# Patient Record
Sex: FEMALE | Race: WHITE | HISPANIC OR LATINO | Employment: UNEMPLOYED | URBAN - METROPOLITAN AREA
[De-identification: names, ages, dates, MRNs, and addresses within clinical notes are randomized per-mention and may not be internally consistent; named-entity substitution may affect disease eponyms.]

---

## 2019-03-19 ENCOUNTER — HOSPITAL ENCOUNTER (EMERGENCY)
Facility: HOSPITAL | Age: 24
Discharge: HOME | End: 2019-03-19
Attending: EMERGENCY MEDICINE
Payer: MEDICAID

## 2019-03-19 VITALS
WEIGHT: 280 LBS | SYSTOLIC BLOOD PRESSURE: 156 MMHG | RESPIRATION RATE: 18 BRPM | HEIGHT: 63 IN | HEART RATE: 80 BPM | OXYGEN SATURATION: 98 % | BODY MASS INDEX: 49.61 KG/M2 | TEMPERATURE: 98 F | DIASTOLIC BLOOD PRESSURE: 74 MMHG

## 2019-03-19 DIAGNOSIS — H60.501 ACUTE OTITIS EXTERNA OF RIGHT EAR, UNSPECIFIED TYPE: Primary | ICD-10-CM

## 2019-03-19 PROCEDURE — 25000003 PHARM REV CODE 250: Performed by: NURSE PRACTITIONER

## 2019-03-19 PROCEDURE — 99283 EMERGENCY DEPT VISIT LOW MDM: CPT

## 2019-03-19 RX ORDER — CIPROFLOXACIN AND DEXAMETHASONE 3; 1 MG/ML; MG/ML
4 SUSPENSION/ DROPS AURICULAR (OTIC) EVERY 12 HOURS
Qty: 7.5 ML | Refills: 0 | Status: SHIPPED | OUTPATIENT
Start: 2019-03-19 | End: 2019-03-26

## 2019-03-19 RX ORDER — CIPROFLOXACIN AND DEXAMETHASONE 3; 1 MG/ML; MG/ML
4 SUSPENSION/ DROPS AURICULAR (OTIC)
Status: COMPLETED | OUTPATIENT
Start: 2019-03-19 | End: 2019-03-19

## 2019-03-19 RX ADMIN — CIPROFLOXACIN AND DEXAMETHASONE 4 DROP: 3; 1 SUSPENSION/ DROPS AURICULAR (OTIC) at 05:03

## 2019-03-19 NOTE — ED TRIAGE NOTES
Patient arrived to ED with c/o right ear pain, fever, decreased hearing in right ear, and pain to mastoid area right ear x 3 days.  Denies sore throat, congestion, nasal drainage, headache, dizziness, or blurred vision.  No acute distress noted.

## 2019-03-19 NOTE — ED PROVIDER NOTES
Encounter Date: 3/19/2019       History     Chief Complaint   Patient presents with    Otalgia     Reports right ear pain and swelling to her rt ear that started approx 3 days ago but worsened this morning. Denies any ear drainage. Pt reports irrigating her ear with peroxide on yesterday without relief. Reports decreased hearing (sounds like water is in her ear). Denies fever/chills.      23-year-old female with no pertinent past medical history presenting for evaluation of right ear pain that began 3 days ago.  Denies drainage or bleeding from the ear.  Denies fever, sinus congestion, rhinorrhea, cough, or any additional symptoms. States that hearing in the affected ear is slightly decreased.  No medications attempted prior to arrival.          Review of patient's allergies indicates:  No Known Allergies  History reviewed. No pertinent past medical history.  History reviewed. No pertinent surgical history.  No family history on file.  Social History     Tobacco Use    Smoking status: Never Smoker   Substance Use Topics    Alcohol use: No     Frequency: Never    Drug use: No     Review of Systems   Constitutional: Negative for chills, fatigue and fever.   HENT: Positive for ear pain. Negative for congestion, ear discharge, facial swelling, hearing loss, nosebleeds, postnasal drip, rhinorrhea, sinus pressure, sinus pain, sneezing, sore throat, tinnitus, trouble swallowing and voice change.    Eyes: Negative for photophobia and pain.   Respiratory: Negative for apnea, cough, choking, chest tightness, shortness of breath, wheezing and stridor.    Cardiovascular: Negative for chest pain, palpitations and leg swelling.   Gastrointestinal: Negative for abdominal pain, constipation, diarrhea, nausea and vomiting.   Genitourinary: Negative for dysuria, flank pain, hematuria, pelvic pain and vaginal discharge.   Musculoskeletal: Negative for arthralgias, back pain, gait problem and neck pain.   Skin: Negative for color  change, pallor, rash and wound.   Neurological: Negative for dizziness, seizures, syncope, facial asymmetry, light-headedness and headaches.   Hematological: Negative for adenopathy.   Psychiatric/Behavioral: Negative for confusion. The patient is not nervous/anxious.        Physical Exam     Initial Vitals [03/19/19 0447]   BP Pulse Resp Temp SpO2   (!) 156/74 80 18 97.8 °F (36.6 °C) 98 %      MAP       --         Physical Exam    Nursing note and vitals reviewed.  Constitutional: She appears well-developed and well-nourished. She is not diaphoretic. She is active and cooperative.  Non-toxic appearance. She does not have a sickly appearance. She does not appear ill. No distress.   Pleasant, afebrile, well appearing, in no distress   HENT:   Head: Normocephalic and atraumatic.   Right Ear: Tympanic membrane and external ear normal. No lacerations. There is tenderness. No foreign bodies. No mastoid tenderness. Tympanic membrane is not perforated. No middle ear effusion. Decreased hearing is noted.   Left Ear: Hearing, tympanic membrane, external ear and ear canal normal. No lacerations. No tenderness. No foreign bodies. No mastoid tenderness. Tympanic membrane is not perforated.  No middle ear effusion. No decreased hearing is noted.   Nose: Nose normal.   TMs are normal and intact bilaterally. No evidence of perforation.  There is swelling and exudate present in the right ear canal.  Pain with manipulation of the external right ear.  The left ear canal is normal. No mastoid erythema, tenderness, or swelling bilaterally. No oropharyngeal abnormalities.  No frontal or maxillary sinus tenderness.   Eyes: Conjunctivae and EOM are normal. Right eye exhibits no discharge. Left eye exhibits no discharge.   Neck: Normal range of motion. Neck supple. No tracheal deviation present.   Cardiovascular: Normal rate.   Pulmonary/Chest: No stridor. No respiratory distress.   Abdominal: Soft. She exhibits no distension. There is no  tenderness.   Musculoskeletal: Normal range of motion. She exhibits no tenderness.   Neurological: She is alert and oriented to person, place, and time. She has normal strength. No cranial nerve deficit or sensory deficit.   Skin: Skin is warm and dry.   Psychiatric: She has a normal mood and affect. Her behavior is normal. Judgment and thought content normal.         ED Course   Procedures  Labs Reviewed - No data to display       Imaging Results    None          Medical Decision Making:   History:   Old Medical Records: I decided to obtain old medical records.  Differential Diagnosis:   Otitis media, otitis externa, TM perforation, mastoiditis, TMJ syndrome, dental infection, others  ED Management:  (S) 23 y.o. female complains of pain in right ear for 3 days. No fever or URI symptoms. Has not been swimming.  States that she believes water may have become trapped in her ear after a shower several days ago.  HPI and physical exam as above.    (O) She appears well, afebrile. Right ear reveals tenderness of the tragus; debris and inflammation in external canal. TM is not well seen due to debris, but visualized aspects appear normal.    (A) Otitis Externa    (P) Instructed to keep ear dry until better; eardrops per orders (Ciprodex).  First dose given in the ED. Advised patient to follow up with her PCP for re-evaluation and further management.  ED return precautions given. All questions regarding diagnosis and plan were answered to the patient's fullest possible satisfaction. Patient expressed understanding of diagnosis, discharge instructions, and return precautions.    My attending physician was available for consultation during this case                      Clinical Impression:       ICD-10-CM ICD-9-CM   1. Acute otitis externa of right ear, unspecified type H60.501 380.10         Disposition:   Disposition: Discharged  Condition: Stable                        Corwin Hollins NP  03/19/19 0536

## 2019-03-19 NOTE — DISCHARGE INSTRUCTIONS
Use antibiotic drops twice daily (every 12 hours) for 7 days.  It may be helpful to have someone administer the drops for you.  Lie down with your right ear in the air before drops are placed in your ear.  Stay in that position for 5 minutes after drops have been placed.  Do not sticking any objects into your ear or use a cotton ball to block your ear.      Schedule an appointment to follow-up with your regular doctor or the one listed on your discharge paperwork for further treatment and testing.  Return to the emergency department for any new or worsening symptoms or as needed for any additional concerns.    Thank you for coming to our Emergency Department today. It is important to remember that some problems are difficult to diagnose and may not be found during your first visit. Be sure to follow up with your primary care doctor.  If you do not have one, you may contact the one listed on your discharge paperwork or you may also call the [facility] Appointment Desk at [phone] to schedule an appointment with one.     Return to the ER with any questions/concerns, new/concerning symptoms, worsening or failure to improve. Do not drive or make any important decisions for 24 hours if you have received any pain medications, sedatives or mood altering drugs during your ER visit.

## 2019-05-29 ENCOUNTER — HOSPITAL ENCOUNTER (EMERGENCY)
Facility: HOSPITAL | Age: 24
Discharge: HOME | End: 2019-05-29
Attending: EMERGENCY MEDICINE
Payer: MEDICAID

## 2019-05-29 VITALS
RESPIRATION RATE: 18 BRPM | HEIGHT: 64 IN | OXYGEN SATURATION: 100 % | HEART RATE: 79 BPM | BODY MASS INDEX: 49.51 KG/M2 | TEMPERATURE: 98 F | SYSTOLIC BLOOD PRESSURE: 134 MMHG | WEIGHT: 290 LBS | DIASTOLIC BLOOD PRESSURE: 62 MMHG

## 2019-05-29 DIAGNOSIS — M79.606 LEG PAIN: ICD-10-CM

## 2019-05-29 DIAGNOSIS — M25.569 KNEE PAIN: Primary | ICD-10-CM

## 2019-05-29 LAB
B-HCG UR QL: NEGATIVE
CTP QC/QA: YES

## 2019-05-29 PROCEDURE — 81025 URINE PREGNANCY TEST: CPT | Performed by: PHYSICIAN ASSISTANT

## 2019-05-29 PROCEDURE — 99284 EMERGENCY DEPT VISIT MOD MDM: CPT | Mod: 25

## 2019-05-29 RX ORDER — IBUPROFEN 600 MG/1
600 TABLET ORAL EVERY 6 HOURS PRN
Qty: 20 TABLET | Refills: 0 | Status: SHIPPED | OUTPATIENT
Start: 2019-05-29 | End: 2019-06-03

## 2019-05-29 NOTE — ED PROVIDER NOTES
Encounter Date: 5/29/2019    SCRIBE #1 NOTE: IClaude, am scribing for, and in the presence of,  Darrel Venegas PA-C. I have scribed the following portions of the note - Other sections scribed: HPI, ROS.       History     Chief Complaint   Patient presents with    Knee Pain     Pt reports on going hx of R knee pain off and on. Pt states this pain started Friday. Pain just below patella and at back of knee.      CC: Knee Pain    HPI: This is a 23 y.o. F who has no PMHx who presents to the ED for emergent evaluation of recurrent atraumatic right knee pain x's 5 days. Pt reports a prior injury to the right knee while exercising in the gym 1.5 years ago. Her last episode of knee pain since the knee injury was 2 months ago. No OTC treatment attempted for current pain since onset. Pt notes contraceptive use. Pt denies right knee swelling, tobacco use, alcohol use, or recreational drug use.     The history is provided by the patient. No  was used.     Review of patient's allergies indicates:  No Known Allergies  History reviewed. No pertinent past medical history.  History reviewed. No pertinent surgical history.  Family History   Problem Relation Age of Onset    Breast cancer Neg Hx     Colon cancer Neg Hx     Ovarian cancer Neg Hx      Social History     Tobacco Use    Smoking status: Never Smoker   Substance Use Topics    Alcohol use: No     Frequency: Never    Drug use: No     Review of Systems   Constitutional: Negative for chills and fever.   HENT: Negative for ear pain and sore throat.    Eyes: Negative for pain.   Respiratory: Negative for cough and shortness of breath.    Cardiovascular: Negative for chest pain.   Gastrointestinal: Negative for abdominal pain, diarrhea, nausea and vomiting.   Genitourinary: Negative for dysuria.   Musculoskeletal: Positive for arthralgias (right knee). Negative for back pain and joint swelling.   Skin: Negative for rash.   Neurological: Negative  for headaches.       Physical Exam     Initial Vitals [05/29/19 1303]   BP Pulse Resp Temp SpO2   136/67 88 18 98.7 °F (37.1 °C) 99 %      MAP       --         Physical Exam    Nursing note and vitals reviewed.  Constitutional: She appears well-developed and well-nourished. No distress.   HENT:   Head: Normocephalic and atraumatic.   Nose: Nose normal.   Mouth/Throat: Oropharynx is clear and moist.   Eyes: EOM are normal. Pupils are equal, round, and reactive to light.   Neck: Normal range of motion. Neck supple.   Cardiovascular: Normal rate, regular rhythm and normal heart sounds. Exam reveals no gallop and no friction rub.    No murmur heard.  Pulmonary/Chest: Breath sounds normal. No respiratory distress. She has no wheezes. She has no rhonchi. She has no rales.   Abdominal: Soft. Bowel sounds are normal. There is no tenderness. There is no rebound and no guarding.   Musculoskeletal: Normal range of motion.        Right knee: She exhibits normal range of motion, no swelling, no effusion, no ecchymosis, no deformity, no erythema, normal alignment, no LCL laxity, normal patellar mobility, no bony tenderness and no MCL laxity. Tenderness found.   There is mild tenderness to the anterior aspect of the knee and the popliteal fossa.   Neurological: She is alert and oriented to person, place, and time. She has normal strength. No cranial nerve deficit or sensory deficit.   Skin: Skin is warm and dry. Capillary refill takes less than 2 seconds.   Psychiatric: She has a normal mood and affect.         ED Course   Procedures  Labs Reviewed   POCT URINE PREGNANCY          Imaging Results          US Lower Extremity Veins Right (Final result)  Result time 05/29/19 15:25:38    Final result by Altaf Ge MD (05/29/19 15:25:38)                 Impression:      1. No sonographic evidence of RLE DVT.      Electronically signed by: Altaf Ge  Date:    05/29/2019  Time:    15:25             Narrative:    EXAMINATION:  US  LOWER EXTREMITY VEINS RIGHT    CLINICAL HISTORY:  Acute onset of right calf pain, swelling and discomfort.    TECHNIQUE:  Grayscale, duplex color doppler, spectral interrogation and graded compression of the right lower extremity veins    COMPARISON:  None    FINDINGS:  Right thigh veins: The common femoral, femoral, popliteal, upper greater saphenous, and deep femoral veins are patent and free of thrombus. The veins are normally compressible and have normal phasic flow and augmentation response.    Right calf veins: The visualized calf veins are patent.    Contralateral CFV: The contralateral (left) common femoral vein is patent and free of thrombus.    Miscellaneous: None                               X-Ray Knee 3 View Right (Final result)  Result time 05/29/19 14:12:29    Final result by Altaf Ge MD (05/29/19 14:12:29)                 Impression:      1. No displaced acute fracture, dislocation, retained radiopaque foreign body or suspicious osseous lesion.      Electronically signed by: Altaf Ge  Date:    05/29/2019  Time:    14:12             Narrative:    EXAMINATION:  XR KNEE 3 VIEW RIGHT    CLINICAL HISTORY:  Right knee pain    TECHNIQUE:  3 views of the right knee    COMPARISON:  None    FINDINGS:  No displaced acute fracture, dislocation, retained radiopaque foreign body or suspicious osseous lesion.  Anatomic alignment is maintained.    Regional soft tissues are grossly unremarkable.                                 Medical Decision Making:   Clinical Tests:   Lab Tests: Ordered and Reviewed  Radiological Study: Ordered and Reviewed  ED Management:  This is an evaluation of a 23 y.o. female who presents to the ED for knee pain.  Vital signs are stable.   Afebrile.  Patient is nontoxic appearing and in no acute distress. There is mild tenderness to palpation to the anterior aspect knee as well as in the popliteal fossa.  No ligamentous laxity.  Patient has equal palpable pulses in the bilateral  lower extremities. No edema, deformity or ecchymoses.  No crepitus.  Patient is ambulatory.  Patient has full range of motion of the right knee.  X-ray of the right knee shows no acute fracture or dislocation.  Ultrasound of the right lower extremity shows no evidence of DVT.  Etiology patient's pain likely secondary to knee strain.  I considered but doubt septic joint, DVT, fracture, dislocation, baker cyst, bursitis.  Patient encouraged to follow up with Orthopedics.    Patient given return precautions and instructed to return to the emergency department for any new or worsening symptoms. Patient verbalized understanding and agreed with plan.               Scribe Attestation:   Scribe #1: I performed the above scribed service and the documentation accurately describes the services I performed. I attest to the accuracy of the note.    Attending Attestation:           Physician Attestation for Scribe:  Physician Attestation Statement for Scribe #1: I, Darrel Venegas PA-C, reviewed documentation, as scribed by Claude Sterling in my presence, and it is both accurate and complete.                    Clinical Impression:       ICD-10-CM ICD-9-CM   1. Knee pain M25.569 719.46   2. Leg pain M79.606 729.5                                Darrel Venegas PA-C  05/29/19 1742

## 2019-10-08 ENCOUNTER — HOSPITAL ENCOUNTER (EMERGENCY)
Facility: HOSPITAL | Age: 24
Discharge: HOME | End: 2019-10-08
Attending: EMERGENCY MEDICINE
Payer: MEDICAID

## 2019-10-08 VITALS
TEMPERATURE: 98 F | SYSTOLIC BLOOD PRESSURE: 125 MMHG | HEART RATE: 73 BPM | WEIGHT: 293 LBS | OXYGEN SATURATION: 99 % | BODY MASS INDEX: 50.02 KG/M2 | HEIGHT: 64 IN | RESPIRATION RATE: 19 BRPM | DIASTOLIC BLOOD PRESSURE: 68 MMHG

## 2019-10-08 DIAGNOSIS — M25.532 LEFT WRIST PAIN: ICD-10-CM

## 2019-10-08 LAB
B-HCG UR QL: NEGATIVE
CTP QC/QA: YES

## 2019-10-08 PROCEDURE — 25000003 PHARM REV CODE 250: Performed by: NURSE PRACTITIONER

## 2019-10-08 PROCEDURE — 81025 URINE PREGNANCY TEST: CPT | Performed by: NURSE PRACTITIONER

## 2019-10-08 PROCEDURE — 99283 EMERGENCY DEPT VISIT LOW MDM: CPT | Mod: 25

## 2019-10-08 RX ORDER — IBUPROFEN 600 MG/1
600 TABLET ORAL
Status: COMPLETED | OUTPATIENT
Start: 2019-10-08 | End: 2019-10-08

## 2019-10-08 RX ORDER — IBUPROFEN 600 MG/1
600 TABLET ORAL EVERY 6 HOURS PRN
Qty: 20 TABLET | Refills: 0 | Status: SHIPPED | OUTPATIENT
Start: 2019-10-08

## 2019-10-08 RX ADMIN — IBUPROFEN 600 MG: 600 TABLET, FILM COATED ORAL at 03:10

## 2019-10-08 NOTE — ED PROVIDER NOTES
Encounter Date: 10/8/2019       History     Chief Complaint   Patient presents with    Wrist Pain     Intermittent left wrist pain x 2 years, but worsening over the past month. pt denies trauma     CC: Wrist pain    HPI:  This is a 23-year-old female presenting for evaluation of intermittent left wrist pain x2 years.  It has progressively worsened over the past month.  She is unsure of any trauma. She was evaluated for this in [place] and was told she needed to follow up with a specialist.  She has attempted no treatment prior to arrival.  She is right-handed.  No fever, chills, nausea, vomiting, diarrhea, vaginal discharge.    The history is provided by the patient. No  was used.     Review of patient's allergies indicates:  No Known Allergies  History reviewed. No pertinent past medical history.  History reviewed. No pertinent surgical history.  Family History   Problem Relation Age of Onset    Breast cancer Neg Hx     Colon cancer Neg Hx     Ovarian cancer Neg Hx      Social History     Tobacco Use    Smoking status: Never Smoker    Smokeless tobacco: Never Used   Substance Use Topics    Alcohol use: No     Frequency: Never    Drug use: No     Review of Systems   Constitutional: Negative for chills and fever.   HENT: Negative for sore throat.    Respiratory: Negative for shortness of breath.    Cardiovascular: Negative for chest pain.   Gastrointestinal: Negative for nausea.   Genitourinary: Negative for dysuria.   Musculoskeletal: Positive for arthralgias. Negative for back pain.   Skin: Negative for rash.   Neurological: Negative for weakness.   Hematological: Does not bruise/bleed easily.       Physical Exam     Initial Vitals [10/08/19 1513]   BP Pulse Resp Temp SpO2   129/73 72 17 98.5 °F (36.9 °C) 99 %      MAP       --         Physical Exam    Constitutional: She appears well-developed and well-nourished. She is not diaphoretic. No distress.   HENT:   Head: Normocephalic and  atraumatic.   Neck: Normal range of motion.   Cardiovascular: Normal rate, regular rhythm, normal heart sounds and intact distal pulses.   Pulmonary/Chest: No respiratory distress.   Musculoskeletal: Normal range of motion.        Left wrist: She exhibits tenderness.   Small non tender mobile mass to the radial aspect of the wrist.  No erythema or warmth.   Neurological: She is alert and oriented to person, place, and time.   Skin: Skin is warm and dry.   Psychiatric: She has a normal mood and affect. Her behavior is normal.         ED Course   Procedures  Labs Reviewed   POCT URINE PREGNANCY          Imaging Results          X-Ray Wrist Complete Left (Final result)  Result time 10/08/19 16:30:00    Final result by Norbert Siegel MD (10/08/19 16:30:00)                 Impression:      No acute displaced fracture-dislocation identified.      Electronically signed by: Norbert Siegel MD  Date:    10/08/2019  Time:    16:30             Narrative:    EXAMINATION:  XR WRIST COMPLETE 3 VIEWS LEFT    CLINICAL HISTORY:  Pain in left wrist    TECHNIQUE:  PA, lateral, and oblique views of the left wrist were performed.    COMPARISON:  None    FINDINGS:  Bones are well mineralized. Overall alignment is within normal limits. No displaced fracture, dislocation or destructive osseous process. Joint spaces appear relatively maintained. No subcutaneous emphysema or radiodense retained foreign body.                                 Medical Decision Making:   ED Management:  This is a 23-year-old female presenting for evaluation of left wrist pain. This is a chronic problem.  No known injury. No snuffbox tenderness. There is a small mobile mass to the radial aspect of the left wrist.  Likely ganglion cyst.  No acute fracture dislocation on x-ray.  Will refer to orthopedics.    Based on my clinical evaluation, I do not appreciate any immediate, emergent, or life threatening condition or etiology that warrants additional workup today.  I  feel the patient can be discharged with close follow-up care.                      Clinical Impression:       ICD-10-CM ICD-9-CM   1. Left wrist pain M25.532 719.43         Disposition:   Disposition: Discharged  Condition: Stable                        Cherise Lamar NP  10/08/19 2201

## 2019-10-08 NOTE — DISCHARGE INSTRUCTIONS

## 2020-01-27 ENCOUNTER — HOSPITAL ENCOUNTER (EMERGENCY)
Facility: HOSPITAL | Age: 25
Discharge: HOME | End: 2020-01-27
Attending: EMERGENCY MEDICINE
Payer: MEDICAID

## 2020-01-27 VITALS
HEART RATE: 78 BPM | BODY MASS INDEX: 53.92 KG/M2 | RESPIRATION RATE: 18 BRPM | TEMPERATURE: 99 F | SYSTOLIC BLOOD PRESSURE: 134 MMHG | OXYGEN SATURATION: 100 % | DIASTOLIC BLOOD PRESSURE: 72 MMHG | HEIGHT: 62 IN | WEIGHT: 293 LBS

## 2020-01-27 DIAGNOSIS — N93.9 VAGINAL BLEEDING: Primary | ICD-10-CM

## 2020-01-27 DIAGNOSIS — N93.8 DYSFUNCTIONAL UTERINE BLEEDING: ICD-10-CM

## 2020-01-27 LAB
ALBUMIN SERPL BCP-MCNC: 3.8 G/DL (ref 3.5–5.2)
ALP SERPL-CCNC: 103 U/L (ref 55–135)
ALT SERPL W/O P-5'-P-CCNC: 27 U/L (ref 10–44)
ANION GAP SERPL CALC-SCNC: 7 MMOL/L (ref 8–16)
AST SERPL-CCNC: 21 U/L (ref 10–40)
B-HCG UR QL: NEGATIVE
BACTERIA #/AREA URNS HPF: ABNORMAL /HPF
BASOPHILS # BLD AUTO: 0.05 K/UL (ref 0–0.2)
BASOPHILS NFR BLD: 0.6 % (ref 0–1.9)
BILIRUB SERPL-MCNC: 0.1 MG/DL (ref 0.1–1)
BILIRUB UR QL STRIP: NEGATIVE
BUN SERPL-MCNC: 9 MG/DL (ref 6–20)
CALCIUM SERPL-MCNC: 9.5 MG/DL (ref 8.7–10.5)
CHLORIDE SERPL-SCNC: 105 MMOL/L (ref 95–110)
CLARITY UR: CLEAR
CO2 SERPL-SCNC: 25 MMOL/L (ref 23–29)
COLOR UR: YELLOW
CREAT SERPL-MCNC: 0.8 MG/DL (ref 0.5–1.4)
CTP QC/QA: YES
DIFFERENTIAL METHOD: ABNORMAL
EOSINOPHIL # BLD AUTO: 0.2 K/UL (ref 0–0.5)
EOSINOPHIL NFR BLD: 2.2 % (ref 0–8)
ERYTHROCYTE [DISTWIDTH] IN BLOOD BY AUTOMATED COUNT: 12.9 % (ref 11.5–14.5)
EST. GFR  (AFRICAN AMERICAN): >60 ML/MIN/1.73 M^2
EST. GFR  (NON AFRICAN AMERICAN): >60 ML/MIN/1.73 M^2
GLUCOSE SERPL-MCNC: 79 MG/DL (ref 70–110)
GLUCOSE UR QL STRIP: NEGATIVE
HCT VFR BLD AUTO: 39.7 % (ref 37–48.5)
HGB BLD-MCNC: 12.8 G/DL (ref 12–16)
HGB UR QL STRIP: ABNORMAL
IMM GRANULOCYTES # BLD AUTO: 0.02 K/UL (ref 0–0.04)
IMM GRANULOCYTES NFR BLD AUTO: 0.2 % (ref 0–0.5)
KETONES UR QL STRIP: NEGATIVE
LEUKOCYTE ESTERASE UR QL STRIP: ABNORMAL
LYMPHOCYTES # BLD AUTO: 2.7 K/UL (ref 1–4.8)
LYMPHOCYTES NFR BLD: 29.7 % (ref 18–48)
MCH RBC QN AUTO: 27.2 PG (ref 27–31)
MCHC RBC AUTO-ENTMCNC: 32.2 G/DL (ref 32–36)
MCV RBC AUTO: 84 FL (ref 82–98)
MICROSCOPIC COMMENT: ABNORMAL
MONOCYTES # BLD AUTO: 0.8 K/UL (ref 0.3–1)
MONOCYTES NFR BLD: 8.4 % (ref 4–15)
NEUTROPHILS # BLD AUTO: 5.3 K/UL (ref 1.8–7.7)
NEUTROPHILS NFR BLD: 58.9 % (ref 38–73)
NITRITE UR QL STRIP: NEGATIVE
NRBC BLD-RTO: 0 /100 WBC
PH UR STRIP: 6 [PH] (ref 5–8)
PLATELET # BLD AUTO: 354 K/UL (ref 150–350)
PMV BLD AUTO: 10.6 FL (ref 9.2–12.9)
POTASSIUM SERPL-SCNC: 4.4 MMOL/L (ref 3.5–5.1)
PROT SERPL-MCNC: 8 G/DL (ref 6–8.4)
PROT UR QL STRIP: NEGATIVE
RBC # BLD AUTO: 4.71 M/UL (ref 4–5.4)
RBC #/AREA URNS HPF: >100 /HPF (ref 0–4)
SODIUM SERPL-SCNC: 137 MMOL/L (ref 136–145)
SP GR UR STRIP: 1.02 (ref 1–1.03)
SQUAMOUS #/AREA URNS HPF: 4 /HPF
URN SPEC COLLECT METH UR: ABNORMAL
UROBILINOGEN UR STRIP-ACNC: NEGATIVE EU/DL
WBC # BLD AUTO: 9 K/UL (ref 3.9–12.7)
WBC #/AREA URNS HPF: 3 /HPF (ref 0–5)

## 2020-01-27 PROCEDURE — 85025 COMPLETE CBC W/AUTO DIFF WBC: CPT

## 2020-01-27 PROCEDURE — 63600175 PHARM REV CODE 636 W HCPCS: Performed by: EMERGENCY MEDICINE

## 2020-01-27 PROCEDURE — 81025 URINE PREGNANCY TEST: CPT | Performed by: NURSE PRACTITIONER

## 2020-01-27 PROCEDURE — 81000 URINALYSIS NONAUTO W/SCOPE: CPT

## 2020-01-27 PROCEDURE — 80053 COMPREHEN METABOLIC PANEL: CPT

## 2020-01-27 PROCEDURE — 96361 HYDRATE IV INFUSION ADD-ON: CPT

## 2020-01-27 PROCEDURE — 99284 EMERGENCY DEPT VISIT MOD MDM: CPT | Mod: 25

## 2020-01-27 PROCEDURE — 96374 THER/PROPH/DIAG INJ IV PUSH: CPT

## 2020-01-27 RX ORDER — KETOROLAC TROMETHAMINE 30 MG/ML
15 INJECTION, SOLUTION INTRAMUSCULAR; INTRAVENOUS
Status: COMPLETED | OUTPATIENT
Start: 2020-01-27 | End: 2020-01-27

## 2020-01-27 RX ADMIN — SODIUM CHLORIDE 1000 ML: 0.9 INJECTION, SOLUTION INTRAVENOUS at 05:01

## 2020-01-27 RX ADMIN — KETOROLAC TROMETHAMINE 15 MG: 30 INJECTION, SOLUTION INTRAMUSCULAR; INTRAVENOUS at 05:01

## 2020-01-27 NOTE — ED PROVIDER NOTES
Encounter Date: 1/27/2020    SCRIBE #1 NOTE: I, Clara Smith, am scribing for, and in the presence of,  Carolyn Jeffries MD. I have scribed the following portions of the note - Other sections scribed: HPI, ROS, PE.       History     Chief Complaint   Patient presents with    Vaginal Bleeding     vaginal bleeding x 3 weeks.  denies pregnancy.     This is a 24 y.o. female with no pertinent PMHx who presents to the ED complaining of vaginal bleeding that started 3 weeks ago. She states that she at first had heavy vaginal bleeding, but now she is having spotting. She notes that she stopped taking her birth control medication in December. She reports that when she was on the birth control medication her menstrual cycle was normal. She notes having associated abdominal cramping, dizziness, and light-headedness. She states that she has an appointment with her OBGYN tomorrow. She denies a PMHx of uterine fibroids. She denies any allergies to medications. Denies SOB, chest pain, nausea, and vomiting. No other associated symptoms.    The history is provided by the patient. No  was used.     Review of patient's allergies indicates:  No Known Allergies  Past Medical History:   Diagnosis Date    Asthma      History reviewed. No pertinent surgical history.  Family History   Problem Relation Age of Onset    Breast cancer Neg Hx     Colon cancer Neg Hx     Ovarian cancer Neg Hx      Social History     Tobacco Use    Smoking status: Light Tobacco Smoker     Types: Cigarettes    Smokeless tobacco: Never Used    Tobacco comment: occasional   Substance Use Topics    Alcohol use: No     Frequency: Never    Drug use: No     Review of Systems   Constitutional: Negative for fever.   HENT: Negative for sore throat.    Respiratory: Negative for shortness of breath.    Cardiovascular: Negative for chest pain.   Gastrointestinal: Positive for abdominal pain (cramping). Negative for nausea and vomiting.    Genitourinary: Positive for vaginal bleeding. Negative for dysuria.   Musculoskeletal: Negative for back pain.   Skin: Negative for rash.   Neurological: Positive for dizziness and light-headedness. Negative for weakness.   Hematological: Does not bruise/bleed easily.       Physical Exam     Initial Vitals [01/27/20 1535]   BP Pulse Resp Temp SpO2   137/61 82 16 97.9 °F (36.6 °C) 100 %      MAP       --         Physical Exam    Nursing note and vitals reviewed.  Constitutional: She appears well-developed and well-nourished. She is not diaphoretic. No distress.   Morbidly obese, BMI 54   HENT:   Head: Normocephalic.   Eyes: Conjunctivae are normal.   Neck: Neck supple.   Cardiovascular: Normal rate and regular rhythm.   Pulmonary/Chest: Breath sounds normal.   Abdominal: Soft. Bowel sounds are normal. She exhibits no distension. There is no tenderness.   Genitourinary: Cervix exhibits discharge. Cervix exhibits no motion tenderness. Right adnexum displays no tenderness. Left adnexum displays no tenderness.   Genitourinary Comments: Clear mucoid discharge with streaks of blood from cervix. RN present as chaperone for pelvic exam.    Musculoskeletal: She exhibits no edema.   Neurological: She is alert and oriented to person, place, and time.   Skin: Skin is warm and dry.   Psychiatric: She has a normal mood and affect.         ED Course   Procedures  Labs Reviewed   CBC W/ AUTO DIFFERENTIAL - Abnormal; Notable for the following components:       Result Value    Platelets 354 (*)     All other components within normal limits   COMPREHENSIVE METABOLIC PANEL - Abnormal; Notable for the following components:    Anion Gap 7 (*)     All other components within normal limits   URINALYSIS, REFLEX TO URINE CULTURE - Abnormal; Notable for the following components:    Occult Blood UA 3+ (*)     Leukocytes, UA 1+ (*)     All other components within normal limits    Narrative:     Preferred Collection Type->Urine, Clean Catch    URINALYSIS MICROSCOPIC - Abnormal; Notable for the following components:    RBC, UA >100 (*)     All other components within normal limits    Narrative:     Preferred Collection Type->Urine, Clean Catch   POCT URINE PREGNANCY          Imaging Results    None          Medical Decision Making:   Initial Assessment:   24-year-old UPT negative female presents with 3 weeks of vaginal bleeding.  Currently, reports spotting.  She also endorses abdominal cramping, lightheadedness.  On exam, patient is well-appearing in no distress. Vital signs within acceptable limits. Her pelvic exam shows that she has streaks of blood in clear mucoid discharge coming from the cervix, there is no cervical motion tenderness. Abdominal exam benign.  I suspect dysfunctional uterine bleeding versus fibroids versus hormone withdrawal bleeding.  Patient has scheduled follow-up with OBGYN tomorrow.  I will treat her with some IV fluids and some Toradol.  Will check blood counts to assure no anemia requiring transfusion.  Clinical Tests:   Lab Tests: Ordered and Reviewed  ED Management:  Hemoglobin and hematocrit within normal limits.  Patient reports feeling improved after Toradol and IV fluids.  No need for transfusion.  I will discharge patient with outpatient follow-up tomorrow, prescription for NSAID.            Scribe Attestation:   Scribe #1: I performed the above scribed service and the documentation accurately describes the services I performed. I attest to the accuracy of the note.                          Clinical Impression:       ICD-10-CM ICD-9-CM   1. Vaginal bleeding N93.9 623.8   2. Dysfunctional uterine bleeding N93.8 626.8               Scribe Attestation: I, Carolyn Jeffries MD, personally performed the services described in this documentation. All medical record entries made by the scribe were at my direction and in my presence. I have reviewed the chart and agree that the record reflects my personal performance and is  accurate and complete.    This dictation has been generated using M-Modal Fluency Direct dictation; some phonetic errors may occur.                    Carolyn Jeffries MD  01/27/20 2137

## 2020-01-27 NOTE — ED TRIAGE NOTES
Pt arrives to ED with c/o vaginal bleeding for past three weeks. Pt also complains of abdominal cramping. Pt denies pain with urination. Pt denies SOB and CP. Pt A&O x4.

## 2022-05-17 ENCOUNTER — HOSPITAL ENCOUNTER (EMERGENCY)
Facility: HOSPITAL | Age: 27
Discharge: HOME | End: 2022-05-17
Attending: EMERGENCY MEDICINE
Payer: MEDICAID

## 2022-05-17 VITALS
BODY MASS INDEX: 51.91 KG/M2 | WEIGHT: 293 LBS | HEIGHT: 63 IN | OXYGEN SATURATION: 96 % | HEART RATE: 92 BPM | TEMPERATURE: 98 F | RESPIRATION RATE: 20 BRPM

## 2022-05-17 DIAGNOSIS — N39.0 URINARY TRACT INFECTION WITHOUT HEMATURIA, SITE UNSPECIFIED: Primary | ICD-10-CM

## 2022-05-17 DIAGNOSIS — R10.30 LOWER ABDOMINAL PAIN: ICD-10-CM

## 2022-05-17 LAB
ALBUMIN SERPL BCP-MCNC: 3.9 G/DL (ref 3.5–5.2)
ALP SERPL-CCNC: 112 U/L (ref 55–135)
ALT SERPL W/O P-5'-P-CCNC: 21 U/L (ref 10–44)
ANION GAP SERPL CALC-SCNC: 9 MMOL/L (ref 8–16)
AST SERPL-CCNC: 20 U/L (ref 10–40)
B-HCG UR QL: NEGATIVE
BACTERIA #/AREA URNS HPF: ABNORMAL /HPF
BASOPHILS # BLD AUTO: 0.05 K/UL (ref 0–0.2)
BASOPHILS NFR BLD: 0.6 % (ref 0–1.9)
BILIRUB SERPL-MCNC: 0.7 MG/DL (ref 0.1–1)
BILIRUB UR QL STRIP: NEGATIVE
BUN SERPL-MCNC: 11 MG/DL (ref 6–20)
CALCIUM SERPL-MCNC: 9.6 MG/DL (ref 8.7–10.5)
CHLORIDE SERPL-SCNC: 102 MMOL/L (ref 95–110)
CLARITY UR: ABNORMAL
CO2 SERPL-SCNC: 27 MMOL/L (ref 23–29)
COLOR UR: YELLOW
CREAT SERPL-MCNC: 0.7 MG/DL (ref 0.5–1.4)
CTP QC/QA: YES
DIFFERENTIAL METHOD: NORMAL
EOSINOPHIL # BLD AUTO: 0.2 K/UL (ref 0–0.5)
EOSINOPHIL NFR BLD: 2.4 % (ref 0–8)
ERYTHROCYTE [DISTWIDTH] IN BLOOD BY AUTOMATED COUNT: 13.4 % (ref 11.5–14.5)
EST. GFR  (AFRICAN AMERICAN): >60 ML/MIN/1.73 M^2
EST. GFR  (NON AFRICAN AMERICAN): >60 ML/MIN/1.73 M^2
GLUCOSE SERPL-MCNC: 97 MG/DL (ref 70–110)
GLUCOSE UR QL STRIP: NEGATIVE
HCG INTACT+B SERPL-ACNC: <1.2 MIU/ML
HCT VFR BLD AUTO: 40.2 % (ref 37–48.5)
HGB BLD-MCNC: 13.1 G/DL (ref 12–16)
HGB UR QL STRIP: NEGATIVE
HYALINE CASTS #/AREA URNS LPF: 1 /LPF
IMM GRANULOCYTES # BLD AUTO: 0.03 K/UL (ref 0–0.04)
IMM GRANULOCYTES NFR BLD AUTO: 0.4 % (ref 0–0.5)
KETONES UR QL STRIP: NEGATIVE
LEUKOCYTE ESTERASE UR QL STRIP: ABNORMAL
LIPASE SERPL-CCNC: 10 U/L (ref 4–60)
LYMPHOCYTES # BLD AUTO: 2.5 K/UL (ref 1–4.8)
LYMPHOCYTES NFR BLD: 31.7 % (ref 18–48)
MCH RBC QN AUTO: 27.6 PG (ref 27–31)
MCHC RBC AUTO-ENTMCNC: 32.6 G/DL (ref 32–36)
MCV RBC AUTO: 85 FL (ref 82–98)
MICROSCOPIC COMMENT: ABNORMAL
MONOCYTES # BLD AUTO: 0.6 K/UL (ref 0.3–1)
MONOCYTES NFR BLD: 7.4 % (ref 4–15)
NEUTROPHILS # BLD AUTO: 4.5 K/UL (ref 1.8–7.7)
NEUTROPHILS NFR BLD: 57.5 % (ref 38–73)
NITRITE UR QL STRIP: NEGATIVE
NRBC BLD-RTO: 0 /100 WBC
PH UR STRIP: 6 [PH] (ref 5–8)
PLATELET # BLD AUTO: 352 K/UL (ref 150–450)
PMV BLD AUTO: 10.4 FL (ref 9.2–12.9)
POC MOLECULAR INFLUENZA A AGN: NEGATIVE
POC MOLECULAR INFLUENZA B AGN: NEGATIVE
POTASSIUM SERPL-SCNC: 4.4 MMOL/L (ref 3.5–5.1)
PROT SERPL-MCNC: 8.1 G/DL (ref 6–8.4)
PROT UR QL STRIP: ABNORMAL
RBC # BLD AUTO: 4.74 M/UL (ref 4–5.4)
RBC #/AREA URNS HPF: 3 /HPF (ref 0–4)
SARS-COV-2 RDRP RESP QL NAA+PROBE: NEGATIVE
SODIUM SERPL-SCNC: 138 MMOL/L (ref 136–145)
SP GR UR STRIP: 1.03 (ref 1–1.03)
SQUAMOUS #/AREA URNS HPF: 23 /HPF
URN SPEC COLLECT METH UR: ABNORMAL
UROBILINOGEN UR STRIP-ACNC: NEGATIVE EU/DL
WBC # BLD AUTO: 7.82 K/UL (ref 3.9–12.7)
WBC #/AREA URNS HPF: 25 /HPF (ref 0–5)

## 2022-05-17 PROCEDURE — 63600175 PHARM REV CODE 636 W HCPCS: Performed by: NURSE PRACTITIONER

## 2022-05-17 PROCEDURE — 84702 CHORIONIC GONADOTROPIN TEST: CPT | Performed by: NURSE PRACTITIONER

## 2022-05-17 PROCEDURE — 81000 URINALYSIS NONAUTO W/SCOPE: CPT | Performed by: EMERGENCY MEDICINE

## 2022-05-17 PROCEDURE — U0002 COVID-19 LAB TEST NON-CDC: HCPCS | Performed by: NURSE PRACTITIONER

## 2022-05-17 PROCEDURE — 99285 EMERGENCY DEPT VISIT HI MDM: CPT | Mod: 25

## 2022-05-17 PROCEDURE — 87086 URINE CULTURE/COLONY COUNT: CPT | Performed by: EMERGENCY MEDICINE

## 2022-05-17 PROCEDURE — 25000003 PHARM REV CODE 250: Performed by: NURSE PRACTITIONER

## 2022-05-17 PROCEDURE — 96374 THER/PROPH/DIAG INJ IV PUSH: CPT

## 2022-05-17 PROCEDURE — 80053 COMPREHEN METABOLIC PANEL: CPT | Performed by: NURSE PRACTITIONER

## 2022-05-17 PROCEDURE — 87502 INFLUENZA DNA AMP PROBE: CPT

## 2022-05-17 PROCEDURE — 96361 HYDRATE IV INFUSION ADD-ON: CPT

## 2022-05-17 PROCEDURE — 85025 COMPLETE CBC W/AUTO DIFF WBC: CPT | Performed by: NURSE PRACTITIONER

## 2022-05-17 PROCEDURE — 81025 URINE PREGNANCY TEST: CPT | Performed by: EMERGENCY MEDICINE

## 2022-05-17 PROCEDURE — 83690 ASSAY OF LIPASE: CPT | Performed by: NURSE PRACTITIONER

## 2022-05-17 RX ORDER — ONDANSETRON 4 MG/1
4 TABLET, ORALLY DISINTEGRATING ORAL EVERY 6 HOURS PRN
Qty: 20 TABLET | Refills: 0 | Status: SHIPPED | OUTPATIENT
Start: 2022-05-17

## 2022-05-17 RX ORDER — CEPHALEXIN 500 MG/1
500 CAPSULE ORAL EVERY 12 HOURS
Qty: 14 CAPSULE | Refills: 0 | Status: SHIPPED | OUTPATIENT
Start: 2022-05-17 | End: 2022-05-24

## 2022-05-17 RX ORDER — ONDANSETRON 2 MG/ML
8 INJECTION INTRAMUSCULAR; INTRAVENOUS
Status: COMPLETED | OUTPATIENT
Start: 2022-05-17 | End: 2022-05-17

## 2022-05-17 RX ADMIN — SODIUM CHLORIDE 1000 ML: 0.9 INJECTION, SOLUTION INTRAVENOUS at 02:05

## 2022-05-17 RX ADMIN — ONDANSETRON 8 MG: 2 INJECTION INTRAMUSCULAR; INTRAVENOUS at 02:05

## 2022-05-17 NOTE — DISCHARGE INSTRUCTIONS
Take all antibiotics at take all antibiotics as prescribed until they are gone.  You should not have any left over even if your symptoms improve.  Take nausea medication as needed.    Thank you for coming to our Emergency Department today. It is important to remember that some problems are difficult to diagnose and may not be found during your first visit. Be sure to follow up with your primary care doctor.  If you do not have one, you may contact the one listed on your discharge paperwork or you may also call the [facility] Appointment Desk at [phone] to schedule an appointment with one.     Return to the ER with any questions/concerns, new/concerning symptoms, worsening or failure to improve. Do not drive or make any important decisions for 24 hours if you have received any pain medications, sedatives or mood altering drugs during your ER visit.

## 2022-05-17 NOTE — Clinical Note
"Gill Forrestobey Ren was seen and treated in our emergency department on 5/17/2022.  She may return to work on 05/20/2022.       If you have any questions or concerns, please don't hesitate to call.      Corwin Hollins NP"

## 2022-05-17 NOTE — ED PROVIDER NOTES
Encounter Date: 5/17/2022    SCRIBE #1 NOTE: I, Makayla Cortes, am scribing for, and in the presence of,  Corwin Hollins NP. I have scribed the following portions of the note - Other sections scribed: HPI; ROS; PE.       History     Chief Complaint   Patient presents with    Diarrhea    Nausea    Abdominal Pain     Pt c/c lower abd pain around pelvic area radiating to both sides for several days. Pt also has nausea and diarrhea. Pt denies vomiting and any urinary abnormalities.      Gill Ren is a 26 y.o. female with Hx of asthma who presents to the ED for chief complaint of intermittent lower and suprapubic abdominal pain onset 5 days ago. Patient reports that the first day of her symptoms was the worst and rated the pain as 8-9/10. She then states that the pain decreased to 5-6/10 2 days ago. Patient has not had any abdominal surgeries. She mentions an associated symptom of nausea. Patient denies vomiting, dysuria, hematuria. No further complaints at this present time.     The history is provided by the patient. No  was used.     Review of patient's allergies indicates:  No Known Allergies  Past Medical History:   Diagnosis Date    Asthma      History reviewed. No pertinent surgical history.  Family History   Problem Relation Age of Onset    Breast cancer Neg Hx     Colon cancer Neg Hx     Ovarian cancer Neg Hx      Social History     Tobacco Use    Smoking status: Light Tobacco Smoker     Types: Cigarettes    Smokeless tobacco: Never Used    Tobacco comment: occasional   Substance Use Topics    Alcohol use: No    Drug use: No     Review of Systems   Constitutional: Negative for chills, fatigue and fever.   HENT: Negative for congestion, ear pain, nosebleeds, postnasal drip, rhinorrhea, sinus pressure and sore throat.    Eyes: Negative for photophobia and pain.   Respiratory: Negative for apnea, cough, choking, chest tightness, shortness of breath, wheezing and stridor.     Cardiovascular: Negative for chest pain, palpitations and leg swelling.   Gastrointestinal: Positive for abdominal pain and nausea. Negative for constipation, diarrhea and vomiting.   Genitourinary: Negative for dysuria, flank pain, hematuria, pelvic pain and vaginal discharge.   Musculoskeletal: Negative for arthralgias, back pain, gait problem and neck pain.   Skin: Negative for color change, pallor, rash and wound.   Neurological: Negative for dizziness, seizures, syncope, facial asymmetry, light-headedness and headaches.   Hematological: Negative for adenopathy.   Psychiatric/Behavioral: Negative for confusion. The patient is not nervous/anxious.        Physical Exam     Initial Vitals [05/17/22 1311]   BP Pulse Resp Temp SpO2   -- 92 20 98 °F (36.7 °C) 96 %      MAP       --         Physical Exam    Nursing note and vitals reviewed.  Constitutional: She appears well-developed and well-nourished. She is not diaphoretic. No distress.   HENT:   Head: Normocephalic and atraumatic.   Right Ear: External ear normal.   Left Ear: External ear normal.   Nose: Nose normal.   Eyes: Conjunctivae and EOM are normal. Right eye exhibits no discharge. Left eye exhibits no discharge.   Neck: Neck supple. No tracheal deviation present.   Normal range of motion.  Cardiovascular: Normal rate.   Pulmonary/Chest: No stridor. No respiratory distress.   Abdominal: Abdomen is soft. She exhibits no distension. There is abdominal tenderness in the right lower quadrant and left lower quadrant.   Musculoskeletal:         General: No tenderness. Normal range of motion.      Cervical back: Normal range of motion and neck supple.     Neurological: She is alert and oriented to person, place, and time. She has normal strength. No cranial nerve deficit or sensory deficit.   Skin: Skin is warm and dry.   Psychiatric: She has a normal mood and affect. Her behavior is normal. Judgment and thought content normal.         ED Course    Procedures  Labs Reviewed   URINALYSIS, REFLEX TO URINE CULTURE - Abnormal; Notable for the following components:       Result Value    Appearance, UA Hazy (*)     Protein, UA Trace (*)     Leukocytes, UA 3+ (*)     All other components within normal limits    Narrative:     Specimen Source->Urine   URINALYSIS MICROSCOPIC - Abnormal; Notable for the following components:    WBC, UA 25 (*)     All other components within normal limits    Narrative:     Specimen Source->Urine   CULTURE, URINE    Narrative:     Specimen Source->Urine   CBC W/ AUTO DIFFERENTIAL    Narrative:     Release to patient->Immediate   COMPREHENSIVE METABOLIC PANEL    Narrative:     Release to patient->Immediate   LIPASE    Narrative:     Release to patient->Immediate   HCG, QUANTITATIVE    Narrative:     Release to patient->Immediate   POCT URINE PREGNANCY   SARS-COV-2 RDRP GENE   POCT INFLUENZA A/B MOLECULAR          Imaging Results          CT Abdomen Pelvis  Without Contrast (Final result)  Result time 05/17/22 15:48:31    Final result by Norbert Siegel MD (05/17/22 15:48:31)                 Impression:      No acute process or CT findings identified to explain patient's symptoms right or left lower quadrant pain on this noncontrast CT.  Specifically, appendix is normal and there is no significant colonic diverticular disease.      Electronically signed by: Norbert Siegel MD  Date:    05/17/2022  Time:    15:48             Narrative:    EXAMINATION:  CT ABDOMEN PELVIS WITHOUT CONTRAST    CLINICAL HISTORY:  LLQ abdominal pain;Nausea/vomiting;RLQ abdominal pain (Age >= 14y);    TECHNIQUE:  Low dose axial images, sagittal and coronal reformations were obtained from the lung bases to the pubic symphysis.  No contrast media was utilized.    COMPARISON:  Chest radiograph 01/27/2016    FINDINGS:  Please note that lack of intravenous contrast limits evaluation of soft tissue and vascular structures.    Imaged lung bases are clear.  Base of the  heart is within normal limits.    Noncontrast appearance of the liver, gallbladder, pancreas, spleen, stomach, duodenum and bilateral adrenal glands are within normal limits.  Few suspected small accessory splenules about its hilum and posteriorly.  No biliary ductal dilatation.    Bilateral kidneys are normal in size, shape and location.  No radiodense calculus seen within the collecting systems on either side or urinary bladder.  No hydronephrosis or significant perinephric stranding.  Ureters are nondilated.  Urinary bladder is within normal limits.  Uterus and bilateral adnexal regions are within normal limits.  No significant volume pelvic fluid.    No ascites, free air or lymphadenopathy by CT criteria noting numerous scattered subcentimeter mesenteric and retroperitoneal lymph nodes.    Appendix and terminal ileum are within normal limits.  No evidence of bowel obstruction or inflammation.  No pneumatosis or portal venous gas.    No significant atherosclerosis.  Aorta tapers normally.    Osseous structures show minimal degenerative change and otherwise appear intact.                                 Medications   sodium chloride 0.9% bolus 1,000 mL (0 mLs Intravenous Stopped 5/17/22 1543)   ondansetron injection 8 mg (8 mg Intravenous Given 5/17/22 1450)     Medical Decision Making:   History:   Old Medical Records: I decided to obtain old medical records.  Clinical Tests:   Lab Tests: Ordered and Reviewed  The following lab test(s) were unremarkable: UPT  Radiological Study: Ordered and Reviewed  ED Management:  Labs reassuring with the exception of possible UTI.  There are many squamous epithelial cells.  Urine culture in process.  CT shows nothing acute.  Will treat with Keflex out of an abundance of caution given patient's symptoms.  Patient is tolerating p.o. without difficulty and very well-appearing prior to discharge.  No evidence of emergent pathology. Advised patient to follow up with her PCP for  re-evaluation and further management.  ED return precautions given. All questions regarding diagnosis and plan were answered to the patient's fullest possible satisfaction. Patient expressed understanding of diagnosis, discharge instructions, and return precautions.            Patient note was created using [employer] voice dictation software.  Any errors in syntax or information may not have been identified and edited prior to signing this note.            Scribe Attestation:   Scribe #1: I performed the above scribed service and the documentation accurately describes the services I performed. I attest to the accuracy of the note.               I, Corwin Hollins NP, personally performed the services described in this documentation.  All medical record entries made by the scribe were at my direction and in my presence.  I have reviewed the chart and agree that the record reflects my personal performance and is accurate and complete.  Clinical Impression:   Final diagnoses:  [R10.30] Lower abdominal pain  [N39.0] Urinary tract infection without hematuria, site unspecified (Primary)          ED Disposition Condition    Discharge Stable        ED Prescriptions     Medication Sig Dispense Start Date End Date Auth. Provider    ondansetron (ZOFRAN-ODT) 4 MG TbDL Take 1 tablet (4 mg total) by mouth every 6 (six) hours as needed (Nausea). 20 tablet 5/17/2022  Corwin Hollins NP    cephALEXin (KEFLEX) 500 MG capsule Take 1 capsule (500 mg total) by mouth every 12 (twelve) hours. for 7 days 14 capsule 5/17/2022 5/24/2022 Corwin Hollins NP        Follow-up Information     Follow up With Specialties Details Why Contact Info    [facility]  Schedule an appointment as soon as possible for a visit in 1 week For further evaluation [address] [facility] [address]  [phone]      [facility] Emergency [facility] Go to  If symptoms worsen, As needed [address] [place]  [address]  [phone]           Corwin Hollins, NP  05/20/22 7791

## 2022-05-19 LAB — BACTERIA UR CULT: NORMAL

## 2023-06-22 ENCOUNTER — OFFICE VISIT (OUTPATIENT)
Dept: DENTISTRY | Facility: CLINIC | Age: 28
End: 2023-06-22

## 2023-06-22 VITALS — HEART RATE: 94 BPM | DIASTOLIC BLOOD PRESSURE: 85 MMHG | TEMPERATURE: 97.6 F | SYSTOLIC BLOOD PRESSURE: 130 MMHG

## 2023-06-22 DIAGNOSIS — Z01.20 ENCOUNTER FOR DENTAL EXAMINATION: Primary | ICD-10-CM

## 2023-06-22 PROCEDURE — D0210 INTRAORAL - COMPLETE SERIES OF RADIOGRAPHIC IMAGES: HCPCS | Performed by: DENTAL HYGIENIST

## 2023-06-22 PROCEDURE — D0150 COMPREHENSIVE ORAL EVALUATION - NEW OR ESTABLISHED PATIENT: HCPCS

## 2023-06-22 RX ORDER — LISINOPRIL 5 MG/1
5 TABLET ORAL DAILY
COMMUNITY
Start: 2023-06-19

## 2023-06-22 NOTE — DENTAL PROCEDURE DETAILS
Jose L Carroll presents for a Comprehensive exam  Verbal consent for treatment given in addition to the forms  Reviewed health history - Patient is ASA I  Consents signed: Yes     Perio: Slight bleeding and Gingivitis  Pain Scale: 0  Caries Assessment: Medium  Radiographs: Complete mouth series  EO/IO/OCS:  WNL     Oral Hygiene instruction reviewed and given  OHI:  Fair  ---Lt to mod calc and plaque     Treatment Plan:  1  Initial prophy  2  Caries control: 1 - DO, 4 - DO, 12 - DO, 13 - MOD, 16 - B, 17 - O, 30 - MOD, 31 - MO, 32 - MO  ---Watch - 5 - O, 20 - D, 29 - D  ---3 - broken down to the root - needs ext  ---Discuss again whether patient wants to get her third molars extracted instead of restoring them at the next visit  3   Occlusal evaluation:   4  Case Difficulty Type 1    Prognosis is Good    Referrals needed: No  Exam:  Magdalena Centeno    NV1:  Initial prophy - 60 min w/ Green  NV2:  Rest 12 - DO, 13 - MOD - 75 min

## 2023-11-02 ENCOUNTER — OFFICE VISIT (OUTPATIENT)
Dept: DENTISTRY | Facility: CLINIC | Age: 28
End: 2023-11-02

## 2023-11-02 VITALS — DIASTOLIC BLOOD PRESSURE: 82 MMHG | HEART RATE: 73 BPM | SYSTOLIC BLOOD PRESSURE: 128 MMHG

## 2023-11-02 DIAGNOSIS — Z01.21 ENCOUNTER FOR DENTAL EXAMINATION AND CLEANING WITH ABNORMAL FINDINGS: Primary | ICD-10-CM

## 2023-11-02 PROCEDURE — D1110 PROPHYLAXIS - ADULT: HCPCS

## 2023-11-02 PROCEDURE — D1330 ORAL HYGIENE INSTRUCTIONS: HCPCS

## 2023-11-02 NOTE — DENTAL PROCEDURE DETAILS
Adult Prophy    ASA II  Pain: upper left   Reviewed M/DH     Exams:  Had CompEx June 2023 - not due today  Xrays:   None  Type of Treatment:  Adult Prophy - used Ultrasonic and Hand scaling,  Polished, Flossed. Reviewed OHI  Brush:  2X/day and Floss 1X/day. Recommended Listerine  / ACT  mouth rinses. Recommended Biotene for dry mouth. EO/OCS Exams:  No significant findings  IO: No significant findings  Oral Hygiene:  Fair  Plaque:  Light    Calculus: Moderate   Bleeding: Moderate   Gingiva:   Red  /  Inflamed  Stain:  Light   Periocharting was not completed.     Perio Findings:  None - gingivitis  Caries Findings:  #12 DO, #13 MOD, #19 MO, #29 DO, #30 MOD, #31 MO - also needs extractions #1, #3, #16, #17, #32  Caries Risk Assessment:    Moderate   caries risk    Treatment Plan:  Not updated  Referral:  No referral given  NV:  Ext #16 or fillings UL  NV2: 6m May 2024    Lacie Daniels, [address] [facility]

## 2023-11-03 ENCOUNTER — OFFICE VISIT (OUTPATIENT)
Dept: DENTISTRY | Facility: CLINIC | Age: 28
End: 2023-11-03

## 2023-11-03 VITALS — HEART RATE: 101 BPM | SYSTOLIC BLOOD PRESSURE: 111 MMHG | TEMPERATURE: 97.3 F | DIASTOLIC BLOOD PRESSURE: 74 MMHG

## 2023-11-03 DIAGNOSIS — K02.9 COMPLEX DENTAL CARIES: Primary | ICD-10-CM

## 2023-11-03 PROCEDURE — D2393 RESIN-BASED COMPOSITE - 3 SURFACES, POSTERIOR: HCPCS | Performed by: DENTIST

## 2023-11-17 ENCOUNTER — OFFICE VISIT (OUTPATIENT)
Dept: DENTISTRY | Facility: CLINIC | Age: 28
End: 2023-11-17

## 2023-11-17 VITALS — HEART RATE: 80 BPM | TEMPERATURE: 98 F | SYSTOLIC BLOOD PRESSURE: 120 MMHG | DIASTOLIC BLOOD PRESSURE: 64 MMHG

## 2023-11-17 DIAGNOSIS — K02.62 CARIES OF DENTIN: Primary | ICD-10-CM

## 2023-11-17 PROCEDURE — D2392 RESIN-BASED COMPOSITE - 2 SURFACES, POSTERIOR: HCPCS

## 2023-11-17 NOTE — DENTAL PROCEDURE DETAILS
Laurence Dunham is a 28 y/o F that presents to [facility]  for a dental restoration and verbally consents for treatment:  Reviewed health history-  Pt is ASA type II  Treatment consents signed: Yes  Perio: Gingivitis  Pain Scale: 0  Caries Assessment: High    Radiographs: Films are current, FMX (6/22/23)  Oral Hygiene instruction reviewed and given  Hygiene recall visits recommended to the patient    Patient agrees with the diagnosis of Caries and the proposed treatment plan for the resin restoration: #12-DO    Applied topical benzocaine, administered 1 carps 4% articaine 1:100k epi via buccal local infiltration. Prepped tooth #12-DO with 245 carbide on high speed. Caries removed with round carbide on slow speed. Placed Thurman matrix. Isolation with cotton rolls. Etch with 37% H2PO4, rinse, dry. Applied Adhese with 20 second scrub once, gentle air dry and light cured for 10s. Restored with Tetric flowable and bulk nate shade A2 and light cured. Refined with finishing burs, polished with enhance point. Verified occlusion and contacts. Patient said that she lost the oral surgery referral that was given to her and she would like another copy of it. Printed the OS referral and gave it to patient along with copy of her x-ray. Patient left satisfied and ambulatory. Attending: Dr. Teetee Alberto   NV: sarita Sage